# Patient Record
Sex: FEMALE | Race: AMERICAN INDIAN OR ALASKA NATIVE
[De-identification: names, ages, dates, MRNs, and addresses within clinical notes are randomized per-mention and may not be internally consistent; named-entity substitution may affect disease eponyms.]

---

## 2018-09-19 ENCOUNTER — HOSPITAL ENCOUNTER (OUTPATIENT)
Dept: HOSPITAL 5 - XRAY | Age: 33
Discharge: HOME | End: 2018-09-19
Attending: INTERNAL MEDICINE
Payer: COMMERCIAL

## 2018-09-19 DIAGNOSIS — M25.562: ICD-10-CM

## 2018-09-19 DIAGNOSIS — Z02.71: Primary | ICD-10-CM

## 2018-09-19 DIAGNOSIS — M25.551: ICD-10-CM

## 2018-09-19 NOTE — XRAY REPORT
FINAL REPORT



EXAM:  XR HIP 2-3V RT



HISTORY:  RIGHT HIP PAIN 



TECHNIQUE:  Frontal view of the pelvis and hips and frontal and

frog-lateral views right hip 



Comparison: None 



FINDINGS:  

The bony structures are without evidence of fracture,

subluxation, lytic or blastic change or periosteal reaction.



The hip joints are maintained.



The soft tissues are unremarkable.



IMPRESSION:  

1. No plain film evidence of bony or soft tissue abnormality.

## 2018-09-19 NOTE — XRAY REPORT
FINAL REPORT



EXAM:  XR KNEE 3V LT



HISTORY:  knee pain 



TECHNIQUE:  Frontal, lateral and oblique views left knee



Comparison: None 



FINDINGS:  

There is no evidence of fracture, subluxation, lytic or blastic

change or periosteal reaction.



The joint spaces are maintained.



The soft tissues are unremarkable.



IMPRESSION:  

1. No plain film evidence of bony or soft tissue abnormality.